# Patient Record
Sex: MALE | HISPANIC OR LATINO | URBAN - NONMETROPOLITAN AREA
[De-identification: names, ages, dates, MRNs, and addresses within clinical notes are randomized per-mention and may not be internally consistent; named-entity substitution may affect disease eponyms.]

---

## 2018-06-13 ENCOUNTER — OFFICE VISIT (OUTPATIENT)
Dept: URBAN - NONMETROPOLITAN AREA CLINIC 18 | Facility: CLINIC | Age: 33
End: 2018-06-13
Payer: MEDICAID

## 2018-06-13 DIAGNOSIS — H18.623 KERATOCONUS, UNSTABLE, BILATERAL: Primary | ICD-10-CM

## 2018-06-13 PROCEDURE — 99213 OFFICE O/P EST LOW 20 MIN: CPT | Performed by: OPTOMETRIST

## 2018-06-13 PROCEDURE — 92025 CPTRIZED CORNEAL TOPOGRAPHY: CPT | Performed by: OPTOMETRIST

## 2018-06-13 ASSESSMENT — INTRAOCULAR PRESSURE
OD: 15
OS: 16

## 2018-06-13 NOTE — IMPRESSION/PLAN
Impression: Keratoconus, unstable, bilateral: R76.639. Plan: Discussed condition in detail with patient. Tung OU today, progression suggested OU. Discussed CXL options and patient interested in referral for corneal consultation.

## 2019-10-18 ENCOUNTER — OFFICE VISIT (OUTPATIENT)
Dept: URBAN - NONMETROPOLITAN AREA CLINIC 18 | Facility: CLINIC | Age: 34
End: 2019-10-18
Payer: COMMERCIAL

## 2019-10-18 DIAGNOSIS — H52.223 REGULAR ASTIGMATISM, BILATERAL: ICD-10-CM

## 2019-10-18 DIAGNOSIS — H52.12 MYOPIA, LEFT EYE: ICD-10-CM

## 2019-10-18 DIAGNOSIS — H18.613 KERATOCONUS, STABLE, BILATERAL: ICD-10-CM

## 2019-10-18 PROCEDURE — 92014 COMPRE OPH EXAM EST PT 1/>: CPT | Performed by: OPTOMETRIST

## 2019-10-18 ASSESSMENT — VISUAL ACUITY
OS: 20/20
OD: 20/30

## 2019-10-18 ASSESSMENT — INTRAOCULAR PRESSURE
OD: 15
OS: 16

## 2019-10-18 NOTE — IMPRESSION/PLAN
Impression: Keratoconus, stable, bilateral: H18.613. Plan: Discussed status with patient. Tung/keratometry today, stable OU s/p crosslinking procedure OD. Specialty contact lenses discussed, deferred at this time. RTC If desired in the future.

## 2019-10-18 NOTE — IMPRESSION/PLAN
Impression: Irregular astigmatism, right eye: H52.211. Plan: Reviewed refractive prescription in detail with patient and need for glasses to improve vision. Release spectacle prescription at this time.

## 2021-02-17 ENCOUNTER — OFFICE VISIT (OUTPATIENT)
Dept: URBAN - NONMETROPOLITAN AREA CLINIC 18 | Facility: CLINIC | Age: 36
End: 2021-02-17
Payer: COMMERCIAL

## 2021-02-17 DIAGNOSIS — H52.211 IRREGULAR ASTIGMATISM, RIGHT EYE: Primary | Chronic | ICD-10-CM

## 2021-02-17 PROCEDURE — 92014 COMPRE OPH EXAM EST PT 1/>: CPT | Performed by: OPTOMETRIST

## 2021-02-17 ASSESSMENT — INTRAOCULAR PRESSURE
OS: 15
OD: 15

## 2021-02-17 ASSESSMENT — VISUAL ACUITY
OS: 20/20
OD: 20/30

## 2021-02-17 NOTE — IMPRESSION/PLAN
Impression: Irregular astigmatism, right eye: H52.211. Plan: Reviewed refractive prescription in detail with patient and need for glasses to improve vision. Discussed specialty contact lenses to reduce double vision, deferred by pt at this time. Polycarbonate lenses recommended. Release spectacle prescription at this time.

## 2021-02-17 NOTE — IMPRESSION/PLAN
Impression: Keratoconus, stable, bilateral: H18.613. Plan: Discussed status with patient. Tung/keratometry today, stable OU s/p crosslinking procedure OD. Specialty contact lenses discussed, deferred by patient at this time. Baseline pachy 508 OD, 520 OS. Will monitor corneal status and acuity in 2-3 months with repeat topography and pachy, will consider potential corneal referral  if any changes observed at next visit. RTC If RGP CLs desired in the future.

## 2022-11-08 ENCOUNTER — OFFICE VISIT (OUTPATIENT)
Dept: URBAN - NONMETROPOLITAN AREA CLINIC 18 | Facility: CLINIC | Age: 37
End: 2022-11-08
Payer: COMMERCIAL

## 2022-11-08 DIAGNOSIS — H52.211 IRREGULAR ASTIGMATISM, RIGHT EYE: Primary | ICD-10-CM

## 2022-11-08 DIAGNOSIS — H18.613 KERATOCONUS, STABLE, BILATERAL: ICD-10-CM

## 2022-11-08 DIAGNOSIS — H52.12 MYOPIA, LEFT EYE: ICD-10-CM

## 2022-11-08 PROCEDURE — 92014 COMPRE OPH EXAM EST PT 1/>: CPT | Performed by: OPTOMETRIST

## 2022-11-08 ASSESSMENT — VISUAL ACUITY
OD: 20/25
OS: 20/20

## 2022-11-08 ASSESSMENT — INTRAOCULAR PRESSURE
OD: 20
OS: 20

## 2022-11-08 NOTE — IMPRESSION/PLAN
Impression: Irregular astigmatism, right eye: H52.211. Plan: Reviewed refractive prescription in detail with patient and need for glasses to improve vision. Trivex lenses recommended. Release spectacle prescription at this time. All signs/symptoms and risks of retinal detachment and tears were discussed in detail. Patient instructed to call office immediately if any symptoms noted.

## 2022-11-08 NOTE — IMPRESSION/PLAN
Impression: Keratoconus, stable, bilateral: H18.613. Plan: Discussed status with patient. Tung/keratometry today. s/p crosslinking procedure OD. Specialty contact lenses discussed, deferred by patient at this time. Baseline pachy 508 OD, 520 OS. Will monitor corneal status and acuity in 6 months with repeat topography and pachy, will consider potential corneal referral  if any changes observed at next visit. RTC If RGP CLs desired in the future.

## 2023-05-18 ENCOUNTER — OFFICE VISIT (OUTPATIENT)
Dept: URBAN - NONMETROPOLITAN AREA CLINIC 18 | Facility: CLINIC | Age: 38
End: 2023-05-18
Payer: MEDICAID

## 2023-05-18 DIAGNOSIS — H18.613 KERATOCONUS, STABLE, BILATERAL: Primary | ICD-10-CM

## 2023-05-18 PROCEDURE — 92025 CPTRIZED CORNEAL TOPOGRAPHY: CPT | Performed by: OPTOMETRIST

## 2023-05-18 PROCEDURE — 99213 OFFICE O/P EST LOW 20 MIN: CPT | Performed by: OPTOMETRIST

## 2023-05-18 ASSESSMENT — INTRAOCULAR PRESSURE
OD: 14
OS: 14

## 2023-05-18 NOTE — IMPRESSION/PLAN
Impression: Keratoconus, stable, bilateral: H18.613. Plan: Discussed status with patient. Tung/keratometry today, stable OD questionable mild change OS. s/p crosslinking procedure OD. Specialty contact lenses discussed, deferred by patient at this time. H/O Baseline pachy 508 OD, 520 OS. Will monitor corneal status and acuity in 6 months with repeat topography and pachy, will consider potential corneal referral  if any changes observed at next visit.

## 2023-12-06 ENCOUNTER — OFFICE VISIT (OUTPATIENT)
Dept: URBAN - NONMETROPOLITAN AREA CLINIC 18 | Facility: CLINIC | Age: 38
End: 2023-12-06
Payer: MEDICAID

## 2023-12-06 DIAGNOSIS — H16.141 PUNCTATE KERATITIS, RIGHT EYE: ICD-10-CM

## 2023-12-06 DIAGNOSIS — H18.613 KERATOCONUS, STABLE, BILATERAL: Primary | ICD-10-CM

## 2023-12-06 PROCEDURE — 92025 CPTRIZED CORNEAL TOPOGRAPHY: CPT | Performed by: OPTOMETRIST

## 2023-12-06 PROCEDURE — 99213 OFFICE O/P EST LOW 20 MIN: CPT | Performed by: OPTOMETRIST

## 2023-12-06 ASSESSMENT — VISUAL ACUITY
OD: 20/60
OS: 20/20

## 2023-12-06 ASSESSMENT — INTRAOCULAR PRESSURE
OD: 21
OS: 20